# Patient Record
Sex: MALE | Race: WHITE | NOT HISPANIC OR LATINO | Employment: OTHER | URBAN - METROPOLITAN AREA
[De-identification: names, ages, dates, MRNs, and addresses within clinical notes are randomized per-mention and may not be internally consistent; named-entity substitution may affect disease eponyms.]

---

## 2024-07-17 ENCOUNTER — HOSPITAL ENCOUNTER (EMERGENCY)
Facility: HOSPITAL | Age: 49
Discharge: OTHER INSTITUTION | End: 2024-07-17

## 2024-07-17 PROCEDURE — 4500999001 HC ED NO CHARGE

## 2024-07-18 ENCOUNTER — PHARMACY VISIT (OUTPATIENT)
Dept: PHARMACY | Facility: CLINIC | Age: 49
End: 2024-07-18
Payer: COMMERCIAL

## 2024-07-18 ENCOUNTER — APPOINTMENT (OUTPATIENT)
Dept: CARDIOLOGY | Facility: HOSPITAL | Age: 49
End: 2024-07-18
Payer: COMMERCIAL

## 2024-07-18 ENCOUNTER — HOSPITAL ENCOUNTER (EMERGENCY)
Facility: HOSPITAL | Age: 49
Discharge: HOME | End: 2024-07-18
Attending: EMERGENCY MEDICINE
Payer: COMMERCIAL

## 2024-07-18 ENCOUNTER — APPOINTMENT (OUTPATIENT)
Dept: RADIOLOGY | Facility: HOSPITAL | Age: 49
End: 2024-07-18
Payer: COMMERCIAL

## 2024-07-18 VITALS
WEIGHT: 222 LBS | BODY MASS INDEX: 30.07 KG/M2 | DIASTOLIC BLOOD PRESSURE: 91 MMHG | TEMPERATURE: 97.2 F | RESPIRATION RATE: 10 BRPM | HEART RATE: 61 BPM | OXYGEN SATURATION: 95 % | SYSTOLIC BLOOD PRESSURE: 125 MMHG | HEIGHT: 72 IN

## 2024-07-18 DIAGNOSIS — I48.0 PAROXYSMAL ATRIAL FIBRILLATION (MULTI): Primary | ICD-10-CM

## 2024-07-18 LAB
ALBUMIN SERPL BCP-MCNC: 4.2 G/DL (ref 3.4–5)
ALP SERPL-CCNC: 50 U/L (ref 33–120)
ALT SERPL W P-5'-P-CCNC: 23 U/L (ref 10–52)
ANION GAP SERPL CALC-SCNC: 14 MMOL/L (ref 10–20)
AST SERPL W P-5'-P-CCNC: 19 U/L (ref 9–39)
BASOPHILS # BLD AUTO: 0.04 X10*3/UL (ref 0–0.1)
BASOPHILS NFR BLD AUTO: 0.6 %
BILIRUB SERPL-MCNC: 0.6 MG/DL (ref 0–1.2)
BUN SERPL-MCNC: 20 MG/DL (ref 6–23)
CALCIUM SERPL-MCNC: 8.9 MG/DL (ref 8.6–10.3)
CARDIAC TROPONIN I PNL SERPL HS: 16 NG/L (ref 0–20)
CHLORIDE SERPL-SCNC: 106 MMOL/L (ref 98–107)
CO2 SERPL-SCNC: 23 MMOL/L (ref 21–32)
CREAT SERPL-MCNC: 1.2 MG/DL (ref 0.5–1.3)
EGFRCR SERPLBLD CKD-EPI 2021: 73 ML/MIN/1.73M*2
EOSINOPHIL # BLD AUTO: 0.12 X10*3/UL (ref 0–0.7)
EOSINOPHIL NFR BLD AUTO: 1.8 %
ERYTHROCYTE [DISTWIDTH] IN BLOOD BY AUTOMATED COUNT: 12.5 % (ref 11.5–14.5)
GLUCOSE SERPL-MCNC: 102 MG/DL (ref 74–99)
HCT VFR BLD AUTO: 44.5 % (ref 41–52)
HGB BLD-MCNC: 15.8 G/DL (ref 13.5–17.5)
IMM GRANULOCYTES # BLD AUTO: 0.04 X10*3/UL (ref 0–0.7)
IMM GRANULOCYTES NFR BLD AUTO: 0.6 % (ref 0–0.9)
LYMPHOCYTES # BLD AUTO: 2.04 X10*3/UL (ref 1.2–4.8)
LYMPHOCYTES NFR BLD AUTO: 30.8 %
MCH RBC QN AUTO: 32.2 PG (ref 26–34)
MCHC RBC AUTO-ENTMCNC: 35.5 G/DL (ref 32–36)
MCV RBC AUTO: 91 FL (ref 80–100)
MONOCYTES # BLD AUTO: 0.52 X10*3/UL (ref 0.1–1)
MONOCYTES NFR BLD AUTO: 7.8 %
NEUTROPHILS # BLD AUTO: 3.87 X10*3/UL (ref 1.2–7.7)
NEUTROPHILS NFR BLD AUTO: 58.4 %
NRBC BLD-RTO: 0 /100 WBCS (ref 0–0)
PLATELET # BLD AUTO: 251 X10*3/UL (ref 150–450)
POTASSIUM SERPL-SCNC: 4 MMOL/L (ref 3.5–5.3)
PROT SERPL-MCNC: 6.3 G/DL (ref 6.4–8.2)
RBC # BLD AUTO: 4.9 X10*6/UL (ref 4.5–5.9)
SODIUM SERPL-SCNC: 139 MMOL/L (ref 136–145)
WBC # BLD AUTO: 6.6 X10*3/UL (ref 4.4–11.3)

## 2024-07-18 PROCEDURE — 99222 1ST HOSP IP/OBS MODERATE 55: CPT | Performed by: INTERNAL MEDICINE

## 2024-07-18 PROCEDURE — 96374 THER/PROPH/DIAG INJ IV PUSH: CPT

## 2024-07-18 PROCEDURE — 36415 COLL VENOUS BLD VENIPUNCTURE: CPT

## 2024-07-18 PROCEDURE — 71045 X-RAY EXAM CHEST 1 VIEW: CPT

## 2024-07-18 PROCEDURE — 2500000001 HC RX 250 WO HCPCS SELF ADMINISTERED DRUGS (ALT 637 FOR MEDICARE OP): Performed by: INTERNAL MEDICINE

## 2024-07-18 PROCEDURE — 99284 EMERGENCY DEPT VISIT MOD MDM: CPT | Mod: 25

## 2024-07-18 PROCEDURE — 93005 ELECTROCARDIOGRAM TRACING: CPT

## 2024-07-18 PROCEDURE — 84075 ASSAY ALKALINE PHOSPHATASE: CPT

## 2024-07-18 PROCEDURE — 85025 COMPLETE CBC W/AUTO DIFF WBC: CPT

## 2024-07-18 PROCEDURE — 2500000005 HC RX 250 GENERAL PHARMACY W/O HCPCS: Performed by: INTERNAL MEDICINE

## 2024-07-18 PROCEDURE — RXMED WILLOW AMBULATORY MEDICATION CHARGE

## 2024-07-18 PROCEDURE — 71045 X-RAY EXAM CHEST 1 VIEW: CPT | Mod: FOREIGN READ | Performed by: RADIOLOGY

## 2024-07-18 PROCEDURE — 84484 ASSAY OF TROPONIN QUANT: CPT

## 2024-07-18 RX ORDER — METOPROLOL TARTRATE 1 MG/ML
2.5 INJECTION, SOLUTION INTRAVENOUS ONCE
Status: COMPLETED | OUTPATIENT
Start: 2024-07-18 | End: 2024-07-18

## 2024-07-18 RX ORDER — APIXABAN 5 MG/1
TABLET, FILM COATED ORAL
Qty: 60 TABLET | Refills: 0 | OUTPATIENT
Start: 2024-07-18

## 2024-07-18 RX ORDER — FLECAINIDE ACETATE 150 MG/1
TABLET ORAL
Qty: 6 TABLET | Refills: 0 | OUTPATIENT
Start: 2024-07-18

## 2024-07-18 RX ORDER — METOPROLOL TARTRATE 25 MG/1
TABLET, FILM COATED ORAL
Qty: 3 TABLET | Refills: 0 | OUTPATIENT
Start: 2024-07-18

## 2024-07-18 RX ORDER — FLECAINIDE ACETATE 150 MG/1
300 TABLET ORAL ONCE AS NEEDED
Qty: 6 TABLET | Refills: 0 | Status: SHIPPED | OUTPATIENT
Start: 2024-07-18

## 2024-07-18 RX ORDER — FLECAINIDE ACETATE 50 MG/1
300 TABLET ORAL ONCE
Status: COMPLETED | OUTPATIENT
Start: 2024-07-18 | End: 2024-07-18

## 2024-07-18 RX ORDER — METOPROLOL TARTRATE 25 MG/1
25 TABLET, FILM COATED ORAL ONCE AS NEEDED
Qty: 3 TABLET | Refills: 0 | Status: SHIPPED | OUTPATIENT
Start: 2024-07-18

## 2024-07-18 RX ADMIN — METOPROLOL TARTRATE 2.5 MG: 5 INJECTION INTRAVENOUS at 11:33

## 2024-07-18 RX ADMIN — FLECAINIDE ACETATE 300 MG: 50 TABLET ORAL at 12:06

## 2024-07-18 RX ADMIN — APIXABAN 5 MG: 5 TABLET, FILM COATED ORAL at 11:33

## 2024-07-18 ASSESSMENT — CHA2DS2 SCORE
HYPERTENSION: NO
DIABETES: NO
CHA2D2S VASC SCORE: 0
CHF OR LEFT VENTRICULAR DYSFUNCTION: NO
VASCULAR DISEASE: NO
AGE IN YEARS: <65
SEX: MALE
PRIOR STROKE OR TIA OR THROMBOEMBOLISM: NO

## 2024-07-18 ASSESSMENT — COLUMBIA-SUICIDE SEVERITY RATING SCALE - C-SSRS
2. HAVE YOU ACTUALLY HAD ANY THOUGHTS OF KILLING YOURSELF?: NO
1. IN THE PAST MONTH, HAVE YOU WISHED YOU WERE DEAD OR WISHED YOU COULD GO TO SLEEP AND NOT WAKE UP?: NO
6. HAVE YOU EVER DONE ANYTHING, STARTED TO DO ANYTHING, OR PREPARED TO DO ANYTHING TO END YOUR LIFE?: NO

## 2024-07-18 ASSESSMENT — PAIN - FUNCTIONAL ASSESSMENT: PAIN_FUNCTIONAL_ASSESSMENT: 0-10

## 2024-07-18 ASSESSMENT — PAIN SCALES - GENERAL
PAINLEVEL_OUTOF10: 0 - NO PAIN

## 2024-07-18 NOTE — ED PROVIDER NOTES
HPI   Chief Complaint   Patient presents with    Shortness of Breath       HPI        Patient History   No past medical history on file.  No past surgical history on file.  No family history on file.  Social History     Tobacco Use    Smoking status: Not on file    Smokeless tobacco: Not on file   Substance Use Topics    Alcohol use: Not on file    Drug use: Not on file       Physical Exam   ED Triage Vitals [07/18/24 0844]   Temperature Heart Rate Respirations BP   36.2 °C (97.2 °F) 77 18 129/84      Pulse Ox Temp Source Heart Rate Source Patient Position   100 % Temporal Monitor Sitting      BP Location FiO2 (%)     Left arm --       Physical Exam      ED Course & MDM                        No data recorded                      Medical Decision Making      Procedure  Procedures

## 2024-07-18 NOTE — ED PROVIDER NOTES
Emergency Department Provider Note        History of Present Illness     History provided by: Patient  Limitations to History: None  External Records Reviewed with Brief Summary: Outpatient progress note from 9/22/22 which showed hx of atrial fibrillation x2 episodes.     HPI:  Elpidio Arora is a 49 y.o. male with a history of intermittent atrial fibrillation, GERD, gout who presents to ED with concern for Afib.  Patient reports yesterday morning, he was visiting his mother in the hospital when he had the sudden onset of malaise and subjective shortness of breath.  He identified the symptoms as similar to prior episodes of A-fib, and decided to wait through yesterday to see if it resolves on its own.  He has had some mild lightheadedness and some tingling of his right hand, which he reports is consistent with prior episodes of A-fib.      Reports no chest pain, pain with breathing, abdominal pain, leg pain/swelling.    Patient is not on any blood thinners.  His only home medications are omeprazole and allopurinol.    Physical Exam   Triage vitals:  T 36.2 °C (97.2 °F)  HR 77  /84  RR 18  O2 100 % None (Room air)    Physical exam:   Triage vitals reviewed.  Constitutional: Well developed adult in no acute distress, non toxic in appearance  Head: Normocephalic, atraumatic  Skin: Intact, dry. No rashes or lesions.  Eyes: Pupils are equal. No conjunctival injection.  Neck: Supple. Trachea is midline.  Pulmonary: Normal work of breathing with no accessory muscle use noted.  Clear to auscultation bilaterally.   Cardiovascular: Regular rate, irregularly irregular rhythm on monitor. No murmurs/gallops/rubs appreciated. 2+ radial and PT pulses bilaterally.   Abdomen: Soft, nondistended. Nontender to palpation.  Extremities: No gross deformities.  Moving all extremities spontaneously without difficulty.  Neuro: Awake and alert. Face is symmetric. Speech is clear. No obvious focal findings.   Psych: Appropriate  mood and affect.      Medical Decision Making & ED Course   Medical Decision Makin y.o. male with PMHx atrial fibrillation who presents in Afib.  On arrival, patient was afebrile, normotensive, with an irregularly irregular HR, RR 18, SpO2 100% on RA.  EKG shows atrial fibrillation at a rate of ~96 bpm.    EKG without evidence of ischemia and troponin wnl so low concern for acute MI. Clinical history is not consistent with PE.  Lab workup revealed no electrolyte abnormalities, no leukocytosis or anemia.    Cardiology was consulted and recommended medication cardioversion with metoprolol and flecainide. Patient successfully converted to NSR. We discussed the need for anticoagulation and patient expressed understanding. He was provided with a prescription for 5 mg Eliquis BID x30 days. Reviewed strict return precautions and strongly recommended follow up with his cardiologist once he returns to [place].  Given that he is currently traveling, he was also provided with as needed doses of metoprolol and flecainide.  Patient was discharged home in stable condition.    ----  Scoring Tools Utilized: [id number] Score: 0       Differential diagnoses considered include but are not limited to: Atrial fibrillation, PE, viral illness     Social Determinants of Health which Significantly Impact Care: None identified   EKG Independent Interpretation: EKG interpreted by myself. Please see ED Course for full interpretation.    Independent Result Review and Interpretation: Chest X-Ray as interpreted by me revealed no evidence of PTX, PNA, or widened mediastinum    Chronic conditions affecting the patient's care: As documented above in MDM    The patient was discussed with the following consultants/services:  Cardiology    Care Considerations: Considered ordering D-dimer , but chose not to because clinical history is not consistent with PE.    ED Course:  ED Course as of 24   Thu 2024   0932 ECG 12 lead  I  independently interpreted the EKG (EM resident): 96 bpm, atrial fibrillation. Normal axis. Narrow QRS, normal Qtc. No ST segment elevations, depressions, or T wave inversions concerning for ischemia. No prior EKG available for comparison []   1023 XR chest 1 view  I independently interpreted the CXR w/o evidence of PTX, PNA, or widened mediastinum. []   2019 Troponin I, High Sensitivity: 16 []      ED Course User Index  [] Sherice Chávez MD         Diagnoses as of 07/18/24 2020   Paroxysmal atrial fibrillation (Multi)     Disposition   As a result of the work-up, the patient was discharged home.  he was informed of his diagnosis and instructed to come back with any concerns or worsening of condition.  he and was agreeable to the plan as discussed above.  he was given the opportunity to ask questions.  All of the patient's questions were answered.      Patient seen and discussed with ED attending physician Dr. Seth.    Sherice Chávez MD  Emergency Medicine PGY2     Sherice Chávez MD  Resident  07/18/24 2020

## 2024-07-18 NOTE — DISCHARGE INSTRUCTIONS
You were seen in the emergency department for atrial fibrillation and you were given metoprolol and flecainide to get your heart back into a normal rhythm.  Please follow-up with your cardiologist as soon as you are able.  For the next month, you should take Eliquis 5 mg twice a day.

## 2024-07-18 NOTE — CONSULTS
Cardiology Consult for afib    History Of Present Illness:    Elpidio Arora is a 49 y.o. male presenting with atrial fibrillation.  He has a history of paroxysmal A-fib with prior cardioversion .  He is currently living in [place] and here visiting family who had surgery at [facility].  Was feeling well until yesterday morning around 9 or 10 AM when he noticed his typical A-fib symptoms that include loud tinnitus, fatigue and just a uncomfortable like feeling.  Does notice some mild palpitations.  No significant chest pressure or dyspnea.  He was hoping would spontaneously resolve as he has had a couple episodes in the last few years that resolved after for 5 hours but it did not and he sought further evaluation.  States this is typically how he feels with atrial fibrillation.  On arrival here EKG confirmed A-fib.    Past Cardiology Tests (Last 3 Years):  [id number] atrial fibrillation      Past Medical History:  He has no past medical history on file.    Past Surgical History:  He has no past surgical history on file.      Social History:  He has no history on file for tobacco use, alcohol use, and drug use.    Family History:  Father with afib     Allergies:  Patient has no known allergies.    ROS:  10 point review of systems including (Constitutional, Eyes, ENMT, Respiratory, Cardiac, Gastrointestinal, Neurological, Psychiatric, and Hematologic) was performed and is otherwise negative.    Objective Data:  Last Recorded Vitals:  Vitals:    24 0844 24 0900 24 0930 24 1015   BP: 129/84 121/85 125/79 116/82   BP Location: Left arm      Patient Position: Sitting      Pulse: 77 100 (!) 103 89   Resp: 18 13 (!) 24 (!) 21   Temp: 36.2 °C (97.2 °F)      TempSrc: Temporal      SpO2: 100% 100% 96% 99%   Weight: 101 kg (222 lb)      Height: 1.829 m (6')        Medical Gas Therapy: None (Room air)  Weight  Av kg (222 lb)  Min: 101 kg (222 lb)  Max: 101 kg (222 lb)      LABS:  CMP:  Results  "from last 7 days   Lab Units 07/18/24  0928   SODIUM mmol/L 139   POTASSIUM mmol/L 4.0   CHLORIDE mmol/L 106   CO2 mmol/L 23   ANION GAP mmol/L 14   BUN mg/dL 20   CREATININE mg/dL 1.20   EGFR mL/min/1.73m*2 73   ALBUMIN g/dL 4.2   ALT U/L 23   AST U/L 19   BILIRUBIN TOTAL mg/dL 0.6     CBC:  Results from last 7 days   Lab Units 07/18/24  0928   WBC AUTO x10*3/uL 6.6   HEMOGLOBIN g/dL 15.8   HEMATOCRIT % 44.5   PLATELETS AUTO x10*3/uL 251   MCV fL 91     COAG:     ABO: No results found for: \"ABO\"  HEME/ENDO:     CARDIAC:   Results from last 7 days   Lab Units 07/18/24  0928   TROPHS ng/L 16             Last I/O:  No intake or output data in the 24 hours ending 07/18/24 1125  Net IO Since Admission: No IO data has been entered for this period [07/18/24 1125]      Imaging Results:  XR chest 1 view    Result Date: 7/18/2024  STUDY: Chest Radiograph;  [7-; 09:52 am] INDICATION: Atrial fibrillation. COMPARISON: None Available ACCESSION NUMBER(S): [id number] ORDERING CLINICIAN: EDISON CASANOVA TECHNIQUE:  Frontal chest was obtained at 09:51 hours. FINDINGS: No acute opacities or effusions are visualized.  Heart size is within normal limits.  There is no evidence of a pneumothorax.    No acute findings on single AP view of the chest. Signed by Ney Jacobs MD      Inpatient Medications:  Scheduled medications   Medication Dose Route Frequency    apixaban  5 mg oral q12h    flecainide  300 mg oral Once    metoprolol  2.5 mg intravenous Once     PRN medications   Medication     Continuous Medications   Medication Dose Last Rate       Outpatient Medications:  No current outpatient medications    Physical Exam:  GENERAL: alert, cooperative, pleasant, in no acute distress  SKIN: warm, dry, no rash.  NECK: no JVD, no GENA  CARDIAC: irregular rate and rhythm with no rubs, murmurs, or gallops  CHEST: Normal respiratory efforts, lungs clear to auscultation bilaterally.  ABDOMEN: soft, nontender, nondistended  EXTREMITIES: no " edema  NEURO: Alert and oriented x 3.  Grossly normal.  Moves all 4 extremities.       Assessment/Plan   49-year-old male with paroxysmal atrial fibrillation who presents with A-fib and overall relatively controlled ventricular rate less than 110 bpm.  By history he has pretty clear onset and offset symptom in the past and confident his symptoms started about 24 hours ago.  [id number] score of 0 and is on no medicines at home.  Due to high confidence in onset and offset of symptoms and low [id number] score imaging of the left atrial appendage is not absolutely necessary in this circumstance.  Reviewed that with him and low risk of NANDINI thrombus.  We also reviewed options with him that include chemical versus electrical cardioversion.  He was in different but wife who is a nurse seem to favor chemical if possible.  He does not have any metoprolol or flecainide at home.    Recommendations:  Metoprolol 2.5 mg IV x 1 now  Flecainide 300 mg x 1  Eliquis 5 mg twice daily  If does not cardiovert within 1 to 2 hours after flecainide, elective electrical cardioversion is reasonable in the ER.  After cardioversion he should be on Eliquis twice daily for  30 days and can follow-up with his primary cardiologist in [place]        Stefan Childs DO

## 2024-07-19 LAB
ATRIAL RATE: 63 BPM
P AXIS: 72 DEGREES
P OFFSET: 193 MS
P ONSET: 121 MS
PR INTERVAL: 198 MS
Q ONSET: 220 MS
Q ONSET: 222 MS
QRS COUNT: 10 BEATS
QRS COUNT: 16 BEATS
QRS DURATION: 76 MS
QRS DURATION: 92 MS
QT INTERVAL: 294 MS
QT INTERVAL: 408 MS
QTC CALCULATION(BAZETT): 373 MS
QTC CALCULATION(BAZETT): 417 MS
QTC FREDERICIA: 344 MS
QTC FREDERICIA: 414 MS
R AXIS: 24 DEGREES
R AXIS: 29 DEGREES
T AXIS: 49 DEGREES
T AXIS: 5 DEGREES
T OFFSET: 369 MS
T OFFSET: 424 MS
VENTRICULAR RATE: 63 BPM
VENTRICULAR RATE: 97 BPM